# Patient Record
Sex: FEMALE | Employment: OTHER | URBAN - METROPOLITAN AREA
[De-identification: names, ages, dates, MRNs, and addresses within clinical notes are randomized per-mention and may not be internally consistent; named-entity substitution may affect disease eponyms.]

---

## 2022-04-19 ENCOUNTER — HOSPITAL ENCOUNTER (OUTPATIENT)
Dept: RADIOLOGY | Facility: HOSPITAL | Age: 82
Discharge: HOME | End: 2022-04-19
Attending: OPTOMETRIST
Payer: MEDICARE

## 2022-04-19 DIAGNOSIS — H49.20 6TH NERVE PALSY: ICD-10-CM

## 2022-04-19 PROCEDURE — A9585 GADOBUTROL INJECTION: HCPCS

## 2022-04-19 PROCEDURE — 70543 MRI ORBT/FAC/NCK W/O &W/DYE: CPT | Mod: 26,,, | Performed by: RADIOLOGY

## 2022-04-19 PROCEDURE — 25500020 PHARM REV CODE 255

## 2022-04-19 PROCEDURE — 70543 MRI ORBITS W W/O CONTRAST: ICD-10-PCS | Mod: 26,,, | Performed by: RADIOLOGY

## 2022-04-19 PROCEDURE — 70543 MRI ORBT/FAC/NCK W/O &W/DYE: CPT | Mod: TC

## 2022-04-19 RX ORDER — GADOBUTROL 604.72 MG/ML
8 INJECTION INTRAVENOUS
Status: COMPLETED | OUTPATIENT
Start: 2022-04-19 | End: 2022-04-19

## 2022-04-19 RX ADMIN — GADOBUTROL 8 ML: 604.72 INJECTION INTRAVENOUS at 12:04

## 2024-03-28 ENCOUNTER — HOSPITAL ENCOUNTER (EMERGENCY)
Facility: HOSPITAL | Age: 84
Discharge: HOME | End: 2024-03-28
Attending: EMERGENCY MEDICINE
Payer: MEDICARE

## 2024-03-28 VITALS
DIASTOLIC BLOOD PRESSURE: 74 MMHG | HEART RATE: 92 BPM | WEIGHT: 160 LBS | TEMPERATURE: 98 F | SYSTOLIC BLOOD PRESSURE: 162 MMHG | BODY MASS INDEX: 25.71 KG/M2 | HEIGHT: 66 IN | OXYGEN SATURATION: 99 % | RESPIRATION RATE: 17 BRPM

## 2024-03-28 DIAGNOSIS — J40 BRONCHITIS: Primary | ICD-10-CM

## 2024-03-28 DIAGNOSIS — R06.02 SHORTNESS OF BREATH: ICD-10-CM

## 2024-03-28 LAB
ALBUMIN SERPL BCP-MCNC: 3.9 G/DL (ref 3.5–5.2)
ALP SERPL-CCNC: 85 U/L (ref 55–135)
ALT SERPL W/O P-5'-P-CCNC: 32 U/L (ref 10–44)
ANION GAP SERPL CALC-SCNC: 12 MMOL/L (ref 8–16)
AST SERPL-CCNC: 23 U/L (ref 10–40)
BASOPHILS # BLD AUTO: 0.01 K/UL (ref 0–0.2)
BASOPHILS NFR BLD: 0.1 % (ref 0–1.9)
BILIRUB SERPL-MCNC: 0.7 MG/DL (ref 0.1–1)
BNP SERPL-MCNC: 52 PG/ML (ref 0–99)
BUN SERPL-MCNC: 14 MG/DL (ref 8–23)
CALCIUM SERPL-MCNC: 9.5 MG/DL (ref 8.7–10.5)
CHLORIDE SERPL-SCNC: 94 MMOL/L (ref 95–110)
CO2 SERPL-SCNC: 25 MMOL/L (ref 23–29)
CREAT SERPL-MCNC: 0.9 MG/DL (ref 0.5–1.4)
DIFFERENTIAL METHOD BLD: ABNORMAL
EOSINOPHIL # BLD AUTO: 0.1 K/UL (ref 0–0.5)
EOSINOPHIL NFR BLD: 0.7 % (ref 0–8)
ERYTHROCYTE [DISTWIDTH] IN BLOOD BY AUTOMATED COUNT: 13.7 % (ref 11.5–14.5)
EST. GFR  (NO RACE VARIABLE): >60 ML/MIN/1.73 M^2
GLUCOSE SERPL-MCNC: 126 MG/DL (ref 70–110)
HCT VFR BLD AUTO: 41 % (ref 37–48.5)
HGB BLD-MCNC: 14 G/DL (ref 12–16)
IMM GRANULOCYTES # BLD AUTO: 0.05 K/UL (ref 0–0.04)
IMM GRANULOCYTES NFR BLD AUTO: 0.4 % (ref 0–0.5)
LACTATE SERPL-SCNC: 1.3 MMOL/L (ref 0.5–2.2)
LYMPHOCYTES # BLD AUTO: 3.2 K/UL (ref 1–4.8)
LYMPHOCYTES NFR BLD: 26.2 % (ref 18–48)
MCH RBC QN AUTO: 28.7 PG (ref 27–31)
MCHC RBC AUTO-ENTMCNC: 34.1 G/DL (ref 32–36)
MCV RBC AUTO: 84 FL (ref 82–98)
MONOCYTES # BLD AUTO: 1.2 K/UL (ref 0.3–1)
MONOCYTES NFR BLD: 10 % (ref 4–15)
NEUTROPHILS # BLD AUTO: 7.6 K/UL (ref 1.8–7.7)
NEUTROPHILS NFR BLD: 62.6 % (ref 38–73)
NRBC BLD-RTO: 0 /100 WBC
OHS QRS DURATION: 144 MS
OHS QTC CALCULATION: 474 MS
PLATELET # BLD AUTO: 351 K/UL (ref 150–450)
PMV BLD AUTO: 8.6 FL (ref 9.2–12.9)
POTASSIUM SERPL-SCNC: 3.7 MMOL/L (ref 3.5–5.1)
PROT SERPL-MCNC: 7 G/DL (ref 6–8.4)
RBC # BLD AUTO: 4.87 M/UL (ref 4–5.4)
SODIUM SERPL-SCNC: 131 MMOL/L (ref 136–145)
TROPONIN I SERPL DL<=0.01 NG/ML-MCNC: 0.01 NG/ML (ref 0–0.03)
WBC # BLD AUTO: 12.09 K/UL (ref 3.9–12.7)

## 2024-03-28 PROCEDURE — 94761 N-INVAS EAR/PLS OXIMETRY MLT: CPT

## 2024-03-28 PROCEDURE — 93010 ELECTROCARDIOGRAM REPORT: CPT | Mod: ,,, | Performed by: INTERNAL MEDICINE

## 2024-03-28 PROCEDURE — 83880 ASSAY OF NATRIURETIC PEPTIDE: CPT | Performed by: EMERGENCY MEDICINE

## 2024-03-28 PROCEDURE — 71045 X-RAY EXAM CHEST 1 VIEW: CPT | Mod: TC

## 2024-03-28 PROCEDURE — 83605 ASSAY OF LACTIC ACID: CPT | Performed by: EMERGENCY MEDICINE

## 2024-03-28 PROCEDURE — 93005 ELECTROCARDIOGRAM TRACING: CPT

## 2024-03-28 PROCEDURE — 85025 COMPLETE CBC W/AUTO DIFF WBC: CPT | Performed by: EMERGENCY MEDICINE

## 2024-03-28 PROCEDURE — 80053 COMPREHEN METABOLIC PANEL: CPT | Performed by: EMERGENCY MEDICINE

## 2024-03-28 PROCEDURE — 71045 X-RAY EXAM CHEST 1 VIEW: CPT | Mod: 26,,, | Performed by: RADIOLOGY

## 2024-03-28 PROCEDURE — 99285 EMERGENCY DEPT VISIT HI MDM: CPT | Mod: 25

## 2024-03-28 PROCEDURE — 84484 ASSAY OF TROPONIN QUANT: CPT | Performed by: EMERGENCY MEDICINE

## 2024-03-28 PROCEDURE — 87040 BLOOD CULTURE FOR BACTERIA: CPT | Mod: 59 | Performed by: EMERGENCY MEDICINE

## 2024-03-28 NOTE — DISCHARGE INSTRUCTIONS
As we discussed, your labs and x-rays today did not show any evidence of infection in your lungs.  There was no fluid collection or any other similar abnormality.  Your probably suffering from bronchitis, as previously thought.  Continue your current medications and treatments, follow-up with your primary care doctor and with Dr. Chi, and return here as needed or if worse in any way.

## 2024-03-28 NOTE — ED PROVIDER NOTES
"Encounter Date: 3/28/2024       History     Chief Complaint   Patient presents with    Shortness of Breath     Pt. C/o SOB and cough with "2 rounds of antibiotics but it's worse.".      83-year-old female here from home for evaluation and treatment of persistent cough, congestion, and mild shortness of breath.  For over 2 weeks.  She has had 2 rounds of antibiotics without any significant relief.  Her daughter, who is with her, states that when she is on the antibiotics, she seems to be feeling better, but wants the antibiotics have finished, the patient's symptoms return.  She has been under the care of Dr. Chi for presumed sinus infection.  Patient denies any diagnosis lung disease such as COPD, no history of heart disease, no CHF etc..  She has not had any fevers.  Her cough is productive of yellowish sputum.  She has been using a nebulizer at home which does help.  Past medical history of hypertension, blood pressure is elevated today at 170/85.  Temperature is 97.5°, pulse 97, respiratory rate 20, O2 saturations 98% on room air.  No nausea or vomiting, no dysuria or hematuria, no loose stools, no rash      Review of patient's allergies indicates:   Allergen Reactions    Pcn [penicillins]     Sulfa (sulfonamide antibiotics)      Past Medical History:   Diagnosis Date    Hypertension      Past Surgical History:   Procedure Laterality Date    CHOLECYSTECTOMY       History reviewed. No pertinent family history.  Social History     Tobacco Use    Smoking status: Never    Smokeless tobacco: Never   Substance Use Topics    Alcohol use: Never    Drug use: Never     Review of Systems   Constitutional: Negative.    HENT:  Positive for congestion and rhinorrhea. Negative for sore throat.    Eyes: Negative.    Respiratory:  Positive for cough and shortness of breath.    Cardiovascular: Negative.    Gastrointestinal: Negative.    Endocrine: Negative.    Genitourinary: Negative.    Musculoskeletal: Negative.  "   Neurological: Negative.    Psychiatric/Behavioral: Negative.         Physical Exam     Initial Vitals [03/28/24 1142]   BP Pulse Resp Temp SpO2   (!) 170/85 97 20 97.5 °F (36.4 °C) 98 %      MAP       --         Physical Exam    Nursing note and vitals reviewed.  Constitutional: She appears well-developed and well-nourished. She is not diaphoretic. No distress.   HENT:   Head: Normocephalic and atraumatic.   Mouth/Throat: Oropharynx is clear and moist.   Eyes: Conjunctivae and EOM are normal. Pupils are equal, round, and reactive to light. No scleral icterus.   Neck: Neck supple. No JVD present.   Normal range of motion.  Cardiovascular:  Normal rate and regular rhythm.           Murmur heard.  Pulmonary/Chest: No stridor. She has wheezes (Slight, bilateral). She has rhonchi (Slight, bilateral).   Abdominal: Abdomen is soft. Bowel sounds are normal. She exhibits no distension. There is no abdominal tenderness.   Musculoskeletal:         General: No tenderness or edema. Normal range of motion.      Cervical back: Normal range of motion and neck supple.     Neurological: She is alert and oriented to person, place, and time. She has normal strength. No cranial nerve deficit or sensory deficit. GCS score is 15. GCS eye subscore is 4. GCS verbal subscore is 5. GCS motor subscore is 6.   Skin: Skin is warm and dry. Capillary refill takes less than 2 seconds. No rash noted. No erythema.   Psychiatric: She has a normal mood and affect. Her behavior is normal.         ED Course   Procedures  Labs Reviewed   CBC W/ AUTO DIFFERENTIAL - Abnormal; Notable for the following components:       Result Value    MPV 8.6 (*)     Immature Grans (Abs) 0.05 (*)     Mono # 1.2 (*)     All other components within normal limits   COMPREHENSIVE METABOLIC PANEL - Abnormal; Notable for the following components:    Sodium 131 (*)     Chloride 94 (*)     Glucose 126 (*)     All other components within normal limits   CULTURE, BLOOD   CULTURE,  BLOOD   TROPONIN I   LACTIC ACID, PLASMA   B-TYPE NATRIURETIC PEPTIDE     EKG Readings: (Independently Interpreted)   EKG personally reviewed by me shows sinus rhythm, occasional PVCs and PACs, right bundle, evidence for lateral and inferior infarct of undetermined age is, 90 beats per minute.  .  No obvious STEMI.       Imaging Results              X-Ray Chest AP Portable (Final result)  Result time 03/28/24 12:10:15      Final result by Trisha Darling MD (03/28/24 12:10:15)                   Impression:      No acute cardiopulmonary disease.      Electronically signed by: Trisha Darling  Date:    03/28/2024  Time:    12:10               Narrative:    EXAMINATION:  XR CHEST AP PORTABLE    CLINICAL HISTORY:  Shortness of breath    TECHNIQUE:  Single frontal view of the chest was performed.    COMPARISON:  None    FINDINGS:  Heart size within normal limits.  Aorta is ectatic and calcified.  The lungs are clear.  There is no pleural effusion or acute bony abnormality.                                    X-Rays:   Independently Interpreted Readings:   Other Readings:  Chest x-ray personally reviewed by me shows clear lungs bilaterally.  Normal cardiac silhouette, normal skeletal structures.    Medications - No data to display  Medical Decision Making  Differential includes pneumonia, pneumothorax, COPD, CHF, viral illness, etc.    Labs remarkable for slightly low sodium level, but otherwise normal.  Normal chest x-ray.  Patient likely suffering from bronchitis, as previously diagnosed.  Recommended continuing current medications and treatments and follow-up with her PCP, and with Dr. Chi, who has been treating her.  Return here as needed or if worse in any way.    Amount and/or Complexity of Data Reviewed  Labs: ordered.  Radiology: ordered.                                      Clinical Impression:  Final diagnoses:  [R06.02] Shortness of breath  [J40] Bronchitis (Primary)          ED Disposition  Condition    Discharge Stable          ED Prescriptions    None       Follow-up Information       Follow up With Specialties Details Why Contact Info    Your primary care doctor  In 1 day      [facility] Emergency Medicine  As needed, If symptoms worsen [address] [address] [phone]             Perfecto Arrieta MD  03/28/24 1416       Perfecto Arrieta MD  03/28/24 [id number]

## 2024-04-02 LAB
BACTERIA BLD CULT: NORMAL
BACTERIA BLD CULT: NORMAL